# Patient Record
Sex: FEMALE | Race: WHITE | NOT HISPANIC OR LATINO | Employment: OTHER
[De-identification: names, ages, dates, MRNs, and addresses within clinical notes are randomized per-mention and may not be internally consistent; named-entity substitution may affect disease eponyms.]

---

## 2024-09-06 ENCOUNTER — TRANSCRIBE ORDERS (OUTPATIENT)
Dept: OTHER | Age: 79
End: 2024-09-06

## 2024-09-06 DIAGNOSIS — H18.20 CORNEAL EDEMA OF RIGHT EYE: Primary | ICD-10-CM

## 2024-09-06 DIAGNOSIS — H17.9 CORNEAL SCAR, RIGHT EYE: ICD-10-CM

## 2024-09-06 DIAGNOSIS — H20.029 RECURRENT ANTERIOR UVEITIS: ICD-10-CM

## 2024-11-11 ENCOUNTER — OFFICE VISIT (OUTPATIENT)
Dept: OPHTHALMOLOGY | Facility: CLINIC | Age: 79
End: 2024-11-11
Attending: OPHTHALMOLOGY
Payer: COMMERCIAL

## 2024-11-11 ENCOUNTER — TELEPHONE (OUTPATIENT)
Dept: OPHTHALMOLOGY | Facility: CLINIC | Age: 79
End: 2024-11-11

## 2024-11-11 DIAGNOSIS — H10.431 CHRONIC FOLLICULAR CONJUNCTIVITIS OF RIGHT EYE: ICD-10-CM

## 2024-11-11 DIAGNOSIS — H40.1123 PRIMARY OPEN-ANGLE GLAUCOMA, LEFT EYE, SEVERE STAGE: ICD-10-CM

## 2024-11-11 DIAGNOSIS — H10.431 CHRONIC FOLLICULAR CONJUNCTIVITIS OF RIGHT EYE: Primary | ICD-10-CM

## 2024-11-11 PROCEDURE — 99204 OFFICE O/P NEW MOD 45 MIN: CPT | Mod: 25 | Performed by: OPHTHALMOLOGY

## 2024-11-11 PROCEDURE — G0463 HOSPITAL OUTPT CLINIC VISIT: HCPCS | Performed by: OPHTHALMOLOGY

## 2024-11-11 PROCEDURE — 65430 CORNEAL SMEAR: CPT | Performed by: OPHTHALMOLOGY

## 2024-11-11 PROCEDURE — 87075 CULTR BACTERIA EXCEPT BLOOD: CPT | Performed by: OPHTHALMOLOGY

## 2024-11-11 PROCEDURE — 87077 CULTURE AEROBIC IDENTIFY: CPT | Performed by: OPHTHALMOLOGY

## 2024-11-11 RX ORDER — POVIDONE-IODINE 5 %
1 SOLUTION, NON-ORAL OPHTHALMIC (EYE) 4 TIMES DAILY
Qty: 10 ML | Refills: 4 | Status: SHIPPED | OUTPATIENT
Start: 2024-11-11 | End: 2024-11-11

## 2024-11-11 RX ORDER — PREDNISOLONE ACETATE 10 MG/ML
1 SUSPENSION/ DROPS OPHTHALMIC 2 TIMES DAILY
COMMUNITY
End: 2024-11-12

## 2024-11-11 RX ORDER — POVIDONE-IODINE 5 %
1 SOLUTION, NON-ORAL OPHTHALMIC (EYE) 4 TIMES DAILY
Qty: 10 ML | Refills: 4 | Status: SHIPPED | OUTPATIENT
Start: 2024-11-11

## 2024-11-11 ASSESSMENT — REFRACTION_WEARINGRX
OD_SPHERE: PLANO
OS_ADD: +2.75
OD_CYLINDER: +0.75
SPECS_TYPE: BIFOCAL
OD_AXIS: 085
OD_ADD: +2.75
OS_SPHERE: +0.75
OS_CYLINDER: SPHERE

## 2024-11-11 ASSESSMENT — SLIT LAMP EXAM - LIDS
COMMENTS: NORMAL
COMMENTS: NORMAL

## 2024-11-11 ASSESSMENT — TONOMETRY
OS_IOP_MMHG: 09
OD_IOP_MMHG: 14
IOP_METHOD: ICARE

## 2024-11-11 ASSESSMENT — VISUAL ACUITY
CORRECTION_TYPE: GLASSES
OS_CC: LP
OD_CC: 20/600
METHOD: SNELLEN - LINEAR

## 2024-11-11 ASSESSMENT — CONF VISUAL FIELD
OS_INFERIOR_NASAL_RESTRICTION: 1
OS_SUPERIOR_NASAL_RESTRICTION: 1
OS_SUPERIOR_TEMPORAL_RESTRICTION: 1
OS_INFERIOR_TEMPORAL_RESTRICTION: 1

## 2024-11-11 ASSESSMENT — EXTERNAL EXAM - RIGHT EYE: OD_EXAM: NORMAL

## 2024-11-11 ASSESSMENT — EXTERNAL EXAM - LEFT EYE: OS_EXAM: NORMAL

## 2024-11-11 NOTE — TELEPHONE ENCOUNTER
Spoke to p    Pharmacy not able to obtain betadine    [facility] is pt's regular pharmacy [phone]    Spoke to [facility] pharmacy team and sent Rx for Betadine    Arturo Wasserman RN 5:22 PM 11/11/24

## 2024-11-11 NOTE — NURSING NOTE
Chief Complaints and History of Present Illnesses   Patient presents with    Corneal Evaluation     Corneal edema RE, coreneal scar RE.     Chief Complaint(s) and History of Present Illness(es)       Corneal Evaluation              Comments: Corneal edema RE, coreneal scar RE.              Comments    Pt states vision stable. Occasional irritation in RE that comes and goes. No new flashes or floaters. Pt notes that she lost vision in her LE from Glaucoma.  No DM.    LATHA Glover November 11, 2024 2:11 PM

## 2024-11-11 NOTE — TELEPHONE ENCOUNTER
[facility] Call Center    Phone Message    May a detailed message be left on voicemail: yes     Reason for Call: Medication Question or concern regarding medication   Prescription Clarification  Name of Medication: povidone-iodine (BETADINE) 5 % ophthalmic solution  Prescribing Provider: Elaina Schultz MD    Pharmacy: [facility] - [address]   What on the order needs clarification? Pharmacy called requesting a call back to discuss this medication. It was sent in a large size bottle and per instructions to open a new bottle daily so it would be wasteful for a larger size. Pharmacy is also stating they are not her normal pharmacy and wondering if this script is accurate or to be filled. They would like a call back to discuss. Please advise.       Action Taken: Message routed to:  [facility]    Travel Screening: Not Applicable

## 2024-11-11 NOTE — PROGRESS NOTES
Chief complaint   Right eye edema    HPI    Pamella Nevarez 79 year old female       Chief Complaint(s) and History of Present Illness(es)       Corneal Evaluation     Additional comments: Corneal edema RE, coreneal scar RE.             Comments    Pt states vision stable. Occasional irritation in RE that comes and goes. No new flashes or floaters. Pt notes that she lost vision in her LE from Glaucoma.  No DM.    LATHA Glover November 11, 2024 2:11 PM                     Past ocular history   Prior eye surgery/laser/Trauma: cataract sx 4y ago  CTL wearer:No  Glasses : -  Family Hx of eye disease: -    PMH     Past Medical History:   Diagnosis Date    Depression     Glaucoma     Hyperlipidemia     Hypertension     Lumbar back pain with radiculopathy affecting left lower extremity     Neurogenic bladder     Seizure disorder (H)     last seizure 14-15 years ago     Spina bifida (H)        PSH     Past Surgical History:   Procedure Laterality Date    CATARACT IOL, RT/LT Right     DECOMPRESSION, FUSION LUMBAR POSTERIOR ONE LEVEL, COMBINED N/A 06/03/2015    Procedure: COMBINED DECOMPRESSION, FUSION LUMBAR POSTERIOR ONE LEVEL;  Surgeon: Kishor Ortega MD;  Location:  OR    EYE SURGERY Left     Duct surgery.    FUSION LUMBAR ANTERIOR ONE LEVEL N/A 06/03/2015    Procedure: FUSION LUMBAR ANTERIOR ONE LEVEL;  Surgeon: Kishor Ortega MD;  Location:  OR    GYN SURGERY      hysterectomy     ORTHOPEDIC SURGERY      hemilaminectomy     wrist excision of ganglion         Meds     Current Outpatient Medications   Medication Sig Dispense Refill    prednisoLONE acetate (PRED FORTE) 1 % ophthalmic suspension Place 1 drop into the right eye 2 times daily.      ALENDRONATE SODIUM PO Take 70 mg by mouth once a week      Calcium Carbonate-Vitamin D (CALCIUM + D PO) Take 1 tablet by mouth 2 times daily      DIAZEPAM PO Take 5 mg by mouth      FLUOXETINE HCL PO Take 20 mg by mouth daily      GABAPENTIN PO Take 300 mg by  mouth Take 1-2 tablets      HYDROcodone-acetaminophen (NORCO) 5-325 MG per tablet Take 2 tablets by mouth 2 times daily 60 tablet 0    Loratadine (CLARITIN PO) Take 10 mg by mouth daily      Multiple Vitamins-Minerals (MULTIVITAMIN PO) Take 1 tablet by mouth daily      NADOLOL PO Take 40 mg by mouth daily      PHENYTOIN SODIUM EXTENDED PO Take 100 mg by mouth 3 times daily      TRAZODONE HCL PO Take 50 mg by mouth At Bedtime       No current facility-administered medications for this visit.       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  I personally obtained the chief complaint(s) and history of present illness.  I confirmed and edited as necessary the review of systems, past medical/surgical history, family history, social history, and examination findings as documented by others; and I examined the patient myself.  I personally reviewed the relevant tests, images, and reports as documented above.  I formulated and edited as necessary the assessment and plan and discussed the findings and management plan with the patient and family. - Elaina Schultz MD

## 2024-11-11 NOTE — PATIENT INSTRUCTIONS
Eye drops right eye    Refresh drop 6 times daily  Vancomycin drop 6 times daily  Betadine 5% to be used 4 times a day in the right eye.    Keep 5 minutes between each drop

## 2024-11-11 NOTE — TELEPHONE ENCOUNTER
[facility] Prior Authorization Team Request    Medication: Vanco 25mg/ml in genteal mod (compound)  Dosing: Place one drop into the left eye 6 times daily  Qty: 20  Day Supply: 12  NDC (required for Medicaid members):    Vancomycin 500mg Solution: [address]-16 Qty 1 powder vial  Sterile Water: [address]-11 Qty 10ml vial  Genteal Tears 0.1-0.2-0.3%: 9668-5079-97 Qty 15 ml bottle    Insurance   BIN: [id number]  PCN: [id number]  Grp: [id number]  ID: [id number]    CoverMyMeds Key (if applicable):     Additional documentation:       Filling Pharmacy: [facility]  Phone Number: [phone]  Contact:  Magnus Pedraza NPI (required for Medicaid members): [id number]

## 2024-11-12 ENCOUNTER — TELEPHONE (OUTPATIENT)
Dept: OPHTHALMOLOGY | Facility: CLINIC | Age: 79
End: 2024-11-12
Payer: COMMERCIAL

## 2024-11-12 NOTE — LETTER
November 12, 2024      Re: Pamella VILLALOBOS Roque   1945    Orders per Ophthalmology [facility] 11/12/2024    Discontinue Prednisolone eye drop.  Fortified Vancomycin eye drop: one drop in right eye 6 times a day.  5% Betadine Ophthalmic solution: one drop in right eye 4 times a day.  Preservative free artificial tears: one drop in right eye 6 times a day.  Wait 5 minutes between eye drop applications.      Thank you for your cooperation in this matter.  Please do not hesitate to contact me if you have any further questions.    Sincerely,      MOUSTAPHA ROMERO

## 2024-11-12 NOTE — LETTER
November 12, 2024      Re: Pamella Nevarez   1945    Orders per Ophthalmology 11/12/2024    Discontinue Prednisolone eye drops.  Fortified Vancomycin eye drops: one drop in Right eye 6 times a day.  5% Betadine Ophthalmic solution: one drop in Right eye 4 times a day.    Thank you for your cooperation in this matter.  Please do not hesitate to contact me if you have any further questions.    Sincerely,      MOUSTAPHA ROMERO RN  Ophthalmology  [phone]

## 2024-11-12 NOTE — TELEPHONE ENCOUNTER
Letter created with orders per request, printed, signed by resident eye provider and faxed per request today    Arturo Wasserman RN 10:50 AM 11/12/24

## 2024-11-12 NOTE — TELEPHONE ENCOUNTER
[facility] Call Center    Phone Message    May a detailed message be left on voicemail: yes     Reason for Call: Form or Letter   Type or form/letter needing completion: Medication orders  Provider: Dr Schultz  Date form needed: ASAP  Once completed: Fax form to: [phone]    Eliana FRAGA from [facility] assisted living calling in to request medication orders to be faxed to them ASAP for the 3 new medication. Patient told Eliana she is to stop taking Prednisone but it's not documented on AVS.     Please call patient back at [phone]. Thank you.    Action Taken: Message routed to:  [facility]    Travel Screening: Not Applicable

## 2024-11-12 NOTE — TELEPHONE ENCOUNTER
Letter created for orders to care center    Arturo Wasserman RN 9:47 AM 11/12/24  Refaxed letter to nurse Eliana today.

## 2024-11-13 NOTE — TELEPHONE ENCOUNTER
[facility] Call Center    Phone Message    May a detailed message be left on voicemail: yes     Reason for Call: Other: Patients daughter Che calling that at assisted living did not get our fax and that was at 5pm. Che would like us to refax it to [phone].     Che is requesting a call back from care team regarding other matters also at [phone]. Thank you.     Action Taken: Message routed to:  [facility] Eye    Travel Screening: Not Applicable

## 2024-11-14 NOTE — TELEPHONE ENCOUNTER
PA Initiation    Medication: COMPOUNDED NON-CONTROLLED (CMPD RX) - PHARMACY TO MIX COMPOUNDED MEDICATION  Insurance Company: MEDICA - Phone [phone] Fax [phone]  Pharmacy Filling the Rx: [place], [place] - [address] [place] [address] 1[address]  Filling Pharmacy Phone: [phone]  Filling Pharmacy Fax: [phone]  Start Date: 11/14/2024

## 2024-11-15 LAB
BACTERIA TISS BX CULT: ABNORMAL
BACTERIA TISS BX CULT: ABNORMAL

## 2024-11-15 NOTE — TELEPHONE ENCOUNTER
PRIOR AUTHORIZATION DENIED    Medication: COMPOUNDED NON-CONTROLLED (CMPD RX) - PHARMACY TO MIX COMPOUNDED MEDICATION  Insurance Company: [employer] Non-Specialty PA's - Phone [phone] Fax [phone]  Denial Date: 11/15/2024  Denial Reason(s):         Appeal Information:       Patient Notified: NO

## 2024-11-18 ENCOUNTER — TELEPHONE (OUTPATIENT)
Dept: OPHTHALMOLOGY | Facility: CLINIC | Age: 79
End: 2024-11-18
Payer: COMMERCIAL

## 2024-11-18 LAB
BACTERIA TISS BX CULT: ABNORMAL
BACTERIA TISS BX CULT: ABNORMAL

## 2024-11-18 NOTE — TELEPHONE ENCOUNTER
Pt/daughter aware PA denied and comfortable paying out of pocket at this time.    Arturo Wasserman RN 11:55 AM 11/18/24

## 2024-11-18 NOTE — TELEPHONE ENCOUNTER
[facility] Call Center    Phone Message    May a detailed message be left on voicemail: yes     Reason for Call: Other: Patient's daughter would like to know if pt can have her 11/25, 2 week follow up done with her local eye provider.      States patient had struggled with the drive on their last visit to the clinic and thinks it'll be easier to have patient seen at their local clinic instead.     Daughter would also like to inform  that they've noticed some improvement with patient's eyes.    [place] reach out to pt's daughter and advise.    Thank You!  Action Taken: Message routed to:  [facility]    Travel Screening: Not Applicable     Date of Service:

## 2024-11-18 NOTE — TELEPHONE ENCOUNTER
JONATHAN, CHARITY [phone]     Spoke to pt and then daughter.    Hard for children to take time off work/travel to [place] and would like to see regular eye provider for follow up.    Called daughter back after daughter reached to referring/regular eye provider and notes to be faxed to referring provider who will review notes/review if comfortable managing pt's eye condition or prefer continued management with Dr. Schultz at this time.    Pt/daughter will contact clinic to cancel next weeks appt if referring provider comfortable.    Note to  to assist in faxing notes to referring clinic-- also fax culture results      FAX: [phone]  Attn: Davy/Dr. Fuentes

## 2024-11-21 DIAGNOSIS — H10.431 CHRONIC FOLLICULAR CONJUNCTIVITIS OF RIGHT EYE: ICD-10-CM

## 2024-12-03 ENCOUNTER — TELEPHONE (OUTPATIENT)
Dept: OPHTHALMOLOGY | Facility: CLINIC | Age: 79
End: 2024-12-03
Payer: COMMERCIAL

## 2024-12-03 NOTE — TELEPHONE ENCOUNTER
Health Call Center    Phone Message    May a detailed message be left on voicemail: yes     Reason for Call: Medication Question or concern regarding medication   Prescription Clarification  Name of Medication: Betadine  Prescribing Provider: Marion   Pharmacy: [facility] - [address]    What on the order needs clarification? Eliana, patient's nurse is requesting pt's prescriptions orders be resent to the pharmacy. States the orders they received were signed by Arturo and they'll need it signed by someone else or they'll need Arturo's NPI number      Action Taken: Message routed to:  [facility]    Travel Screening: Not Applicable     Date of Service:

## 2024-12-03 NOTE — TELEPHONE ENCOUNTER
Eliana, Nurse [phone]     Spoke to nursing and daughter    Per telephone encounter on 11--- ok to to continue vancomycin until follow up December 20th and STOP the Betadine eye drops.    Pt has not used since about [phone] per nursing    Nursing does have the vancomycin.    Nursing will need discontinuation order for Betadine    Will ask cornea team to create/sign discontinuation order/letter to be faxed to nursing    Fax:  [phone]     Arturo Wasserman RN 12:41 PM 12/03/24

## 2024-12-20 ENCOUNTER — TRANSFERRED RECORDS (OUTPATIENT)
Dept: HEALTH INFORMATION MANAGEMENT | Facility: CLINIC | Age: 79
End: 2024-12-20

## 2024-12-24 PROBLEM — H11.823 CONJUNCTIVOCHALASIS OF BOTH EYES: Status: ACTIVE | Noted: 2024-12-20

## 2024-12-24 PROBLEM — H10.431: Status: ACTIVE | Noted: 2024-12-20

## 2025-01-07 ENCOUNTER — TELEPHONE (OUTPATIENT)
Dept: OPHTHALMOLOGY | Facility: CLINIC | Age: 80
End: 2025-01-07
Payer: COMMERCIAL

## 2025-01-07 NOTE — TELEPHONE ENCOUNTER
[facility] Call Center    Phone Message    May a detailed message be left on voicemail: yes     Reason for Call: Other: Maureen states they need Clinic notes for patient's last visit on 12/20/2024, as well as any Preop instructions/orders.  Please fax to [phone]     Action Taken: Message routed to:  [facility] Ophthalmology    Travel Screening: Not Applicable     Date of Service:

## 2025-02-05 ENCOUNTER — ANESTHESIA EVENT (OUTPATIENT)
Dept: SURGERY | Facility: AMBULATORY SURGERY CENTER | Age: 80
End: 2025-02-05
Payer: COMMERCIAL

## 2025-02-05 ENCOUNTER — TELEPHONE (OUTPATIENT)
Dept: OPHTHALMOLOGY | Facility: CLINIC | Age: 80
End: 2025-02-05
Payer: COMMERCIAL

## 2025-02-05 DIAGNOSIS — Z98.890 POSTOPERATIVE EYE STATE: Primary | ICD-10-CM

## 2025-02-05 RX ORDER — OFLOXACIN 3 MG/ML
1-2 SOLUTION/ DROPS OPHTHALMIC 4 TIMES DAILY
Qty: 5 ML | Refills: 1 | Status: SHIPPED | OUTPATIENT
Start: 2025-02-05

## 2025-02-05 RX ORDER — PREDNISOLONE ACETATE 10 MG/ML
1-2 SUSPENSION/ DROPS OPHTHALMIC 4 TIMES DAILY
Qty: 5 ML | Refills: 1 | Status: SHIPPED | OUTPATIENT
Start: 2025-02-05

## 2025-02-05 NOTE — ANESTHESIA PREPROCEDURE EVALUATION
Anesthesia Pre-Procedure Evaluation    Patient: Pamella Nevarez   MRN: [id number] : 1945        Procedure : Procedure(s):  RIGHT EYE EXCISION, of conjunctivochalasis with Vancomycin subconjunctival injection          Past Medical History:   Diagnosis Date     Depression      Glaucoma      Hyperlipidemia      Hypertension      Lumbar back pain with radiculopathy affecting left lower extremity      Neurogenic bladder      Seizure disorder (H)     last seizure 14-15 years ago      Spina bifida (H)       Past Surgical History:   Procedure Laterality Date     CATARACT IOL, RT/LT Right      DECOMPRESSION, FUSION LUMBAR POSTERIOR ONE LEVEL, COMBINED N/A 2015    Procedure: COMBINED DECOMPRESSION, FUSION LUMBAR POSTERIOR ONE LEVEL;  Surgeon: Kishor Ortega MD;  Location:  OR     EYE SURGERY Left     Duct surgery.     FUSION LUMBAR ANTERIOR ONE LEVEL N/A 2015    Procedure: FUSION LUMBAR ANTERIOR ONE LEVEL;  Surgeon: Kishor Ortega MD;  Location:  OR     GYN SURGERY      hysterectomy      ORTHOPEDIC SURGERY      hemilaminectomy      wrist excision of ganglion        Allergies   Allergen Reactions     Pyridium [Phenazopyridine]      Sulfamethoxazole-Trimethoprim       Social History     Tobacco Use     Smoking status: Never     Smokeless tobacco: Not on file   Substance Use Topics     Alcohol use: Not on file      Wt Readings from Last 1 Encounters:   06/03/15 73.9 kg (163 lb)        Anesthesia Evaluation   Pt has had prior anesthetic. Type: General.        ROS/MED HX  ENT/Pulmonary:       Neurologic:       Cardiovascular:     (+)  hypertension- -   -  - -                                      METS/Exercise Tolerance:     Hematologic:  - neg hematologic  ROS     Musculoskeletal: Comment: Spina bifida      GI/Hepatic:     (+) GERD,                   Renal/Genitourinary:  - neg Renal ROS     Endo:  - neg endo ROS     Psychiatric/Substance Use:  - neg psychiatric ROS     Infectious Disease:  - neg  "infectious disease ROS     Malignancy:  - neg malignancy ROS     Other:            Physical Exam    Airway        Mallampati: II   TM distance: > 3 FB   Neck ROM: full     Respiratory Devices and Support         Dental       (+) Multiple crowns, permanant bridges      Cardiovascular   cardiovascular exam normal          Pulmonary   pulmonary exam normal            OUTSIDE LABS:  CBC:   Lab Results   Component Value Date    HGB 10.4 (L) 06/06/2015    HGB 11.1 (L) 06/05/2015     BMP:   Lab Results   Component Value Date    POTASSIUM 4.0 06/03/2015    CR 0.66 06/03/2015     COAGS: No results found for: \"PTT\", \"INR\", \"FIBR\"  POC:   Lab Results   Component Value Date     (H) 06/04/2015     HEPATIC: No results found for: \"ALBUMIN\", \"PROTTOTAL\", \"ALT\", \"AST\", \"GGT\", \"ALKPHOS\", \"BILITOTAL\", \"BILIDIRECT\", \"ERICK\"  OTHER: No results found for: \"PH\", \"LACT\", \"A1C\", \"JESSEE\", \"PHOS\", \"MAG\", \"LIPASE\", \"AMYLASE\", \"TSH\", \"T4\", \"T3\", \"CRP\", \"SED\"    Anesthesia Plan    ASA Status:  3    NPO Status:  NPO Appropriate    Anesthesia Type: MAC.     - Reason for MAC: straight local not clinically adequate              Consents    Anesthesia Plan(s) and associated risks, benefits, and realistic alternatives discussed. Questions answered and patient/representative(s) expressed understanding.     - Discussed:     - Discussed with:  Patient      - Extended Intubation/Ventilatory Support Discussed: No.      - Patient is DNR/DNI Status: No     Use of blood products discussed: No .     Postoperative Care    Pain management: IV analgesics, Oral pain medications.        Comments:               Caroline Junior MD    I have reviewed the pertinent notes and labs in the chart from the past 30 days and (re)examined the patient.  Any updates or changes from those notes are reflected in this note.    Clinically Significant Risk Factors Present on Admission                                          "

## 2025-02-05 NOTE — TELEPHONE ENCOUNTER
MARILEE left on triage line.  Person questioning Vancomycin for the patient's pharmacy to provide.  The patient doesn't have to get any drops before the procedure.

## 2025-02-06 ENCOUNTER — ANESTHESIA (OUTPATIENT)
Dept: SURGERY | Facility: AMBULATORY SURGERY CENTER | Age: 80
End: 2025-02-06
Payer: COMMERCIAL

## 2025-02-06 RX ORDER — FENTANYL CITRATE 50 UG/ML
INJECTION, SOLUTION INTRAMUSCULAR; INTRAVENOUS PRN
Status: DISCONTINUED | OUTPATIENT
Start: 2025-02-06 | End: 2025-02-06

## 2025-02-06 RX ORDER — LIDOCAINE HYDROCHLORIDE 20 MG/ML
INJECTION, SOLUTION INFILTRATION; PERINEURAL PRN
Status: DISCONTINUED | OUTPATIENT
Start: 2025-02-06 | End: 2025-02-06

## 2025-02-06 RX ORDER — PROPOFOL 10 MG/ML
INJECTION, EMULSION INTRAVENOUS CONTINUOUS PRN
Status: DISCONTINUED | OUTPATIENT
Start: 2025-02-06 | End: 2025-02-06

## 2025-02-06 RX ORDER — LABETALOL 20 MG/4 ML (5 MG/ML) INTRAVENOUS SYRINGE
PRN
Status: DISCONTINUED | OUTPATIENT
Start: 2025-02-06 | End: 2025-02-06

## 2025-02-06 RX ORDER — PROPOFOL 10 MG/ML
INJECTION, EMULSION INTRAVENOUS PRN
Status: DISCONTINUED | OUTPATIENT
Start: 2025-02-06 | End: 2025-02-06

## 2025-02-06 RX ADMIN — LIDOCAINE HYDROCHLORIDE 100 MG: 20 INJECTION, SOLUTION INFILTRATION; PERINEURAL at 13:10

## 2025-02-06 RX ADMIN — PROPOFOL 40 MG: 10 INJECTION, EMULSION INTRAVENOUS at 13:11

## 2025-02-06 RX ADMIN — PROPOFOL 10 MCG/KG/MIN: 10 INJECTION, EMULSION INTRAVENOUS at 13:20

## 2025-02-06 RX ADMIN — LABETALOL 20 MG/4 ML (5 MG/ML) INTRAVENOUS SYRINGE 5 MG: at 13:21

## 2025-02-06 RX ADMIN — FENTANYL CITRATE 25 MCG: 50 INJECTION, SOLUTION INTRAMUSCULAR; INTRAVENOUS at 13:09

## 2025-02-06 RX ADMIN — FENTANYL CITRATE 25 MCG: 50 INJECTION, SOLUTION INTRAMUSCULAR; INTRAVENOUS at 13:38

## 2025-02-06 RX ADMIN — SODIUM CHLORIDE, SODIUM LACTATE, POTASSIUM CHLORIDE, CALCIUM CHLORIDE: 600; 310; 30; 20 INJECTION, SOLUTION INTRAVENOUS at 12:59

## 2025-02-06 NOTE — ANESTHESIA POSTPROCEDURE EVALUATION
Patient: Pamella Nevarez    Procedure: Procedure(s):  RIGHT EYE EXCISION, of conjunctivochalasis with Vancomycin subconjunctival injection       Anesthesia Type:  MAC    Note:  Disposition: Outpatient   Postop Pain Control: Uneventful            Sign Out: Well controlled pain   PONV: No   Neuro/Psych: Uneventful            Sign Out: Acceptable/Baseline neuro status   Airway/Respiratory: Uneventful            Sign Out: Acceptable/Baseline resp. status   CV/Hemodynamics: Uneventful            Sign Out: Acceptable CV status; No obvious hypovolemia; No obvious fluid overload   Other NRE: NONE   DID A NON-ROUTINE EVENT OCCUR? No       Last vitals:  Vitals Value Taken Time   /89 02/06/25 1430   Temp 36.1  C (97  F) 02/06/25 1430   Pulse 101 02/06/25 1430   Resp 18 02/06/25 1430   SpO2 94 % 02/06/25 1430       Electronically Signed By: Caroline Junior MD  February 6, 2025  3:16 PM

## 2025-02-06 NOTE — ANESTHESIA CARE TRANSFER NOTE
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    Anesthesia Type:   MAC     Note:    Oropharynx: oropharynx clear of all foreign objects and spontaneously breathing  Level of Consciousness: awake  Oxygen Supplementation: room air    Independent Airway: airway patency satisfactory and stable  Dentition: dentition unchanged  Vital Signs Stable: post-procedure vital signs reviewed and stable  Report to RN Given: handoff report given  Patient transferred to: Phase II    Handoff Report: Identifed the Patient, Identified the Reponsible Provider, Reviewed the pertinent medical history, Discussed the surgical course, Reviewed Intra-OP anesthesia mangement and issues during anesthesia, Set expectations for post-procedure period and Allowed opportunity for questions and acknowledgement of understanding      Vitals:  Vitals Value Taken Time   /85 02/06/25 1406   Temp 36.5  C (97.7  F) 02/06/25 1410   Pulse 102 02/06/25 1406   Resp 18 02/06/25 1410   SpO2 95 % 02/06/25 1411   Vitals shown include unfiled device data.    Electronically Signed By: BRITTANY Chao CRNA  February 6, 2025  2:12 PM

## 2025-02-19 RX ORDER — POVIDONE-IODINE 5 MG/ML
SOLUTION OPHTHALMIC
Qty: 30 ML | OUTPATIENT
Start: 2025-02-19